# Patient Record
Sex: MALE | Race: WHITE
[De-identification: names, ages, dates, MRNs, and addresses within clinical notes are randomized per-mention and may not be internally consistent; named-entity substitution may affect disease eponyms.]

---

## 2020-07-17 ENCOUNTER — HOSPITAL ENCOUNTER (EMERGENCY)
Dept: HOSPITAL 41 - JD.ED | Age: 40
Discharge: HOME | End: 2020-07-17
Payer: COMMERCIAL

## 2020-07-17 DIAGNOSIS — Z20.828: ICD-10-CM

## 2020-07-17 DIAGNOSIS — Z88.0: ICD-10-CM

## 2020-07-17 DIAGNOSIS — B34.9: Primary | ICD-10-CM

## 2020-07-17 PROCEDURE — U0002 COVID-19 LAB TEST NON-CDC: HCPCS

## 2020-07-17 NOTE — EDM.PDOC
ED HPI GENERAL MEDICAL PROBLEM





- General


Chief Complaint: General


Stated Complaint: DIABETIC COMPLAINT


Time Seen by Provider: 07/17/20 12:45


Source of Information: Reports: Patient, RN Notes Reviewed





- History of Present Illness


INITIAL COMMENTS - FREE TEXT/NARRATIVE: 





39 yr old male comes in with sx of lightheadedness, fatigue, nonspecific 

dizziness, paresthesias upper body that first started this morning.  No unusal 

sx yesterday.  No chest pain, cough or difficulty breathing.  





- Related Data


                                    Allergies











Allergy/AdvReac Type Severity Reaction Status Date / Time


 


Penicillins Allergy Severe Facial Verified 07/17/20 12:34





   Swelling  














Past Medical History


Gastrointestinal History: Reports: Hiatal Hernia


Other Gastrointestinal History: Repaired 2010





- Past Surgical History


HEENT Surgical History: Reports: Oral Surgery, Other (See Below)


Other HEENT Surgeries/Procedures: [place] teeth removed; tubes in ears placed as 

child


Musculoskeletal Surgical History: Reports: Other (See Below)


Other Musculoskeletal Surgeries/Procedures:: Broke RT arm, sx to repair





Social & Family History





- Tobacco Use


Smoking Status *Q: Never Smoker





ED ROS GENERAL





- Review of Systems


Review Of Systems: See Below


Constitutional: Denies: Fever, Chills, Diaphoresis


HEENT: Denies: Rhinitis, Throat Pain


Respiratory: Denies: Shortness of Breath, Cough


Cardiovascular: Denies: Chest Pain


Endocrine: Reports: Fatigue


GI/Abdominal: Denies: Abdominal Pain, Nausea, Vomiting


Musculoskeletal: Denies: Neck Pain, Shoulder Pain, Arm Pain


Skin: Denies: Rash


Neurological: Reports: Dizziness, Numbness (starnge "prickly sensation upper 

body").  Denies: Headache





ED EXAM, GENERAL





- Physical Exam


Exam: See Below


General Appearance: Alert, No Apparent Distress


Eye Exam: Bilateral Eye: PERRL


Throat/Mouth: Normal Inspection, Normal Oropharynx


Head: Atraumatic


Neck: Supple


Respiratory/Chest: No Respiratory Distress, Lungs Clear, Normal Breath Sounds


Cardiovascular: Bradycardia


GI/Abdominal: Soft, Non-Tender


Back Exam: No: CVA Tenderness (L), CVA Tenderness (R)


Extremities: Normal Inspection.  No: Leg Pain, Increased Warmth


Neurological: Alert, Oriented, No Motor/Sensory Deficits


Skin Exam: Warm, Dry, Normal Color, No Rash





EKG INTERPRETATION


EKG Date: 07/17/20


Rhythm: NSR


P-Wave: Present


QRS: Normal


ST-T: Normal





Course





- Vital Signs


Last Recorded V/S: 


                                Last Vital Signs











Temp  97.2 F   07/17/20 12:34


 


Pulse  50 L  07/17/20 12:34


 


Resp  16   07/17/20 12:34


 


BP  115/80   07/17/20 12:34


 


Pulse Ox  97   07/17/20 12:34














- Orders/Labs/Meds


Orders: 


                               Active Orders 24 hr











 Category Date Time Status


 


 CORONAVIRUS COVID-19 PCR [facility] Stat Lab  07/17/20 Received











Labs: 


                                Laboratory Tests











  07/17/20 07/17/20 07/17/20 Range/Units





  13:40 13:40 13:40 


 


WBC   12.34 H   (4.23-9.07)  K/mm3


 


RBC   5.38   (4.63-6.08)  M/mm3


 


Hgb   15.7   (13.7-17.5)  gm/dl


 


Hct   45.6   (40.1-51.0)  %


 


MCV   84.8   (79.0-92.2)  fl


 


MCH   29.2   (25.7-32.2)  pg


 


MCHC   34.4   (32.2-35.5)  g/dl


 


RDW Std Deviation   43.5   (35.1-43.9)  fL


 


Plt Count   309   (163-337)  K/mm3


 


MPV   9.6   (9.4-12.3)  fl


 


Neut % (Auto)   69.9 H   (34.0-67.9)  %


 


Lymph % (Auto)   21.9   (21.8-53.1)  %


 


Mono % (Auto)   6.1   (5.3-12.2)  %


 


Eos % (Auto)   1.6   (0.8-7.0)  


 


Baso % (Auto)   0.2   (0.1-1.2)  %


 


Neut # (Auto)   8.62 H   (1.78-5.38)  K/mm3


 


Lymph # (Auto)   2.70   (1.32-3.57)  K/mm3


 


Mono # (Auto)   0.75   (0.30-0.82)  K/mm3


 


Eos # (Auto)   0.20   (0.04-0.54)  K/mm3


 


Baso # (Auto)   0.03   (0.01-0.08)  K/mm3


 


Sodium    142  (136-145)  mEq/L


 


Potassium    4.4  (3.5-5.1)  mEq/L


 


Chloride    106  ()  mEq/L


 


Carbon Dioxide    25  (21-32)  mEq/L


 


Anion Gap    15.4 H  (5-15)  


 


BUN    11  (7-18)  mg/dL


 


Creatinine    0.9  (0.7-1.3)  mg/dL


 


Est Cr Clr Drug Dosing    111.29  mL/min


 


Estimated GFR (MDRD)    > 60  (>60)  mL/min


 


BUN/Creatinine Ratio    12.2 L  (14-18)  


 


Glucose    111 H  ()  mg/dL


 


Calcium    9.2  (8.5-10.1)  mg/dL


 


Total Bilirubin    1.4 H  (0.2-1.0)  mg/dL


 


AST    22  (15-37)  U/L


 


ALT    46  (16-63)  U/L


 


Alkaline Phosphatase    87  ()  U/L


 


Troponin I     (0.00-0.056)  ng/mL


 


C-Reactive Protein  <0.2    (<1.0)  mg/dL


 


Total Protein    7.1  (6.4-8.2)  g/dl


 


Albumin    3.6  (3.4-5.0)  g/dl


 


Globulin    3.5  gm/dL


 


Albumin/Globulin Ratio    1.0  (1-2)  














  07/17/20 Range/Units





  13:40 


 


WBC   (4.23-9.07)  K/mm3


 


RBC   (4.63-6.08)  M/mm3


 


Hgb   (13.7-17.5)  gm/dl


 


Hct   (40.1-51.0)  %


 


MCV   (79.0-92.2)  fl


 


MCH   (25.7-32.2)  pg


 


MCHC   (32.2-35.5)  g/dl


 


RDW Std Deviation   (35.1-43.9)  fL


 


Plt Count   (163-337)  K/mm3


 


MPV   (9.4-12.3)  fl


 


Neut % (Auto)   (34.0-67.9)  %


 


Lymph % (Auto)   (21.8-53.1)  %


 


Mono % (Auto)   (5.3-12.2)  %


 


Eos % (Auto)   (0.8-7.0)  


 


Baso % (Auto)   (0.1-1.2)  %


 


Neut # (Auto)   (1.78-5.38)  K/mm3


 


Lymph # (Auto)   (1.32-3.57)  K/mm3


 


Mono # (Auto)   (0.30-0.82)  K/mm3


 


Eos # (Auto)   (0.04-0.54)  K/mm3


 


Baso # (Auto)   (0.01-0.08)  K/mm3


 


Sodium   (136-145)  mEq/L


 


Potassium   (3.5-5.1)  mEq/L


 


Chloride   ()  mEq/L


 


Carbon Dioxide   (21-32)  mEq/L


 


Anion Gap   (5-15)  


 


BUN   (7-18)  mg/dL


 


Creatinine   (0.7-1.3)  mg/dL


 


Est Cr Clr Drug Dosing   mL/min


 


Estimated GFR (MDRD)   (>60)  mL/min


 


BUN/Creatinine Ratio   (14-18)  


 


Glucose   ()  mg/dL


 


Calcium   (8.5-10.1)  mg/dL


 


Total Bilirubin   (0.2-1.0)  mg/dL


 


AST   (15-37)  U/L


 


ALT   (16-63)  U/L


 


Alkaline Phosphatase   ()  U/L


 


Troponin I  < 0.017  (0.00-0.056)  ng/mL


 


C-Reactive Protein   (<1.0)  mg/dL


 


Total Protein   (6.4-8.2)  g/dl


 


Albumin   (3.4-5.0)  g/dl


 


Globulin   gm/dL


 


Albumin/Globulin Ratio   (1-2)  














- Re-Assessments/Exams


Free Text/Narrative Re-Assessment/Exam: 





07/17/20 17:33


labs are relatively nl, EKG and CXR nl.  Has not been around a lot of people but

some at work and otherwise.  Will do a covid screen precautionary.  Discharge 

instr. as instructed. 





Departure





- Departure


Time of Disposition: 15:08


Disposition: Home, Self-Care 01


Condition: Fair


Clinical Impression: 


 Viral syndrome








- Discharge Information


Instructions:  Viral Respiratory Infection, Easy-To-Read


Referrals: 


PCP,None [Primary Care Provider] - 


Forms:  ED Department Discharge, ED Return to Work/School Form


Additional Instructions: 


Rest, drink plenty of water to maintain hydration.  Covid screen has been 

collected.  Self Isolate until you get results of the covid screen back. We will

call you in 2 to 3 days when those results become available.  Return to ED as 

needed.  Follow up clinic as needed.  





Sepsis Event Note (ED)





- Evaluation


Sepsis Screening Result: No Definite Risk





- Focused Exam


Vital Signs: 


                                   Vital Signs











  Temp Pulse Resp BP Pulse Ox


 


 07/17/20 12:34  97.2 F  50 L  16  115/80  97














- My Orders


Last 24 Hours: 


My Active Orders





07/17/20


CORONAVIRUS COVID-19 PCR Merged with [facility] Stat 














- Assessment/Plan


Last 24 Hours: 


My Active Orders





07/17/20


CORONAVIRUS COVID-19 PCR Merged with [facility] Stat